# Patient Record
Sex: MALE | Race: WHITE | NOT HISPANIC OR LATINO | Employment: FULL TIME | URBAN - METROPOLITAN AREA
[De-identification: names, ages, dates, MRNs, and addresses within clinical notes are randomized per-mention and may not be internally consistent; named-entity substitution may affect disease eponyms.]

---

## 2023-04-04 ENCOUNTER — TELEPHONE (OUTPATIENT)
Dept: PRIMARY CARE | Facility: CLINIC | Age: 33
End: 2023-04-04
Payer: COMMERCIAL

## 2023-04-04 DIAGNOSIS — F41.9 ANXIETY: Primary | ICD-10-CM

## 2023-04-04 RX ORDER — ESCITALOPRAM OXALATE 20 MG/1
20 TABLET ORAL DAILY
Qty: 30 TABLET | Refills: 0 | Status: SHIPPED | OUTPATIENT
Start: 2023-04-04 | End: 2023-04-26 | Stop reason: SDUPTHER

## 2023-04-04 RX ORDER — ESCITALOPRAM OXALATE 20 MG/1
1 TABLET ORAL DAILY
COMMUNITY
Start: 2022-12-24 | End: 2023-04-04 | Stop reason: SDUPTHER

## 2023-04-05 PROBLEM — R03.0 ELEVATED BP WITHOUT DIAGNOSIS OF HYPERTENSION: Status: ACTIVE | Noted: 2023-04-05

## 2023-04-05 PROBLEM — F41.1 GAD (GENERALIZED ANXIETY DISORDER): Status: ACTIVE | Noted: 2023-04-05

## 2023-04-05 PROBLEM — K21.9 GERD (GASTROESOPHAGEAL REFLUX DISEASE): Status: ACTIVE | Noted: 2023-04-05

## 2023-04-05 PROBLEM — E78.2 MIXED HYPERLIPIDEMIA: Status: ACTIVE | Noted: 2023-04-05

## 2023-04-05 PROBLEM — K52.9 COLITIS: Status: ACTIVE | Noted: 2023-04-05

## 2023-04-05 PROBLEM — D17.39 LIPOMA OF SKIN AND SUBCUTANEOUS TISSUE OF EXTREMITY: Status: ACTIVE | Noted: 2023-04-05

## 2023-04-05 PROBLEM — E78.5 HYPERLIPIDEMIA: Status: ACTIVE | Noted: 2023-04-05

## 2023-04-05 PROBLEM — N20.0 NEPHROLITHIASIS: Status: ACTIVE | Noted: 2023-04-05

## 2023-04-05 RX ORDER — ROSUVASTATIN CALCIUM 20 MG/1
20 TABLET, COATED ORAL DAILY
COMMUNITY
End: 2023-04-26 | Stop reason: ALTCHOICE

## 2023-04-05 RX ORDER — TESTOSTERONE CYPIONATE 1000 MG/10ML
INJECTION, SOLUTION INTRAMUSCULAR
COMMUNITY
Start: 2022-04-25 | End: 2023-04-26 | Stop reason: ALTCHOICE

## 2023-04-05 RX ORDER — PANTOPRAZOLE SODIUM 40 MG/1
TABLET, DELAYED RELEASE ORAL
COMMUNITY
End: 2023-04-26 | Stop reason: ALTCHOICE

## 2023-04-26 ENCOUNTER — OFFICE VISIT (OUTPATIENT)
Dept: PRIMARY CARE | Facility: CLINIC | Age: 33
End: 2023-04-26
Payer: COMMERCIAL

## 2023-04-26 VITALS
DIASTOLIC BLOOD PRESSURE: 78 MMHG | BODY MASS INDEX: 33.07 KG/M2 | HEIGHT: 70 IN | SYSTOLIC BLOOD PRESSURE: 129 MMHG | WEIGHT: 231 LBS

## 2023-04-26 DIAGNOSIS — F41.9 ANXIETY: ICD-10-CM

## 2023-04-26 DIAGNOSIS — R22.2 ABDOMINAL WALL LUMP: Primary | ICD-10-CM

## 2023-04-26 PROBLEM — K21.9 GERD (GASTROESOPHAGEAL REFLUX DISEASE): Status: RESOLVED | Noted: 2023-04-05 | Resolved: 2023-04-26

## 2023-04-26 PROBLEM — K52.9 COLITIS: Status: RESOLVED | Noted: 2023-04-05 | Resolved: 2023-04-26

## 2023-04-26 PROBLEM — N20.0 NEPHROLITHIASIS: Status: RESOLVED | Noted: 2023-04-05 | Resolved: 2023-04-26

## 2023-04-26 PROBLEM — R03.0 ELEVATED BP WITHOUT DIAGNOSIS OF HYPERTENSION: Status: RESOLVED | Noted: 2023-04-05 | Resolved: 2023-04-26

## 2023-04-26 PROCEDURE — 99214 OFFICE O/P EST MOD 30 MIN: CPT | Performed by: FAMILY MEDICINE

## 2023-04-26 RX ORDER — ESCITALOPRAM OXALATE 20 MG/1
20 TABLET ORAL DAILY
Qty: 30 TABLET | Refills: 0 | Status: SHIPPED | OUTPATIENT
Start: 2023-04-26 | End: 2023-04-26 | Stop reason: SDUPTHER

## 2023-04-26 RX ORDER — ESCITALOPRAM OXALATE 20 MG/1
20 TABLET ORAL DAILY
Qty: 30 TABLET | Refills: 2 | Status: SHIPPED | OUTPATIENT
Start: 2023-04-26 | End: 2023-08-10 | Stop reason: SDUPTHER

## 2023-04-26 ASSESSMENT — PATIENT HEALTH QUESTIONNAIRE - PHQ9
SUM OF ALL RESPONSES TO PHQ9 QUESTIONS 1 AND 2: 0
1. LITTLE INTEREST OR PLEASURE IN DOING THINGS: NOT AT ALL
2. FEELING DOWN, DEPRESSED OR HOPELESS: NOT AT ALL

## 2023-04-26 NOTE — PROGRESS NOTES
"Subjective   Patient ID: Иван Noble is a 32 y.o. male who presents for Med Refill.    HPI   Anxiety was controlled with lexapro. No insomnia, poor appetite, depression, mood swings or hi/si. Left lower abd subcutaneous lump gets bigger and tender lately.   Review of Systems    Objective   /78   Ht 1.778 m (5' 10\")   Wt 105 kg (231 lb)   BMI 33.15 kg/m²     Physical Exam  NAD, No sclera icterus. Normal pupil size, Lungs: CTA bilaterally, heart: RRR, abdomen: soft, no tenderness, BS+. 1.5x2.5 subcutaneous mobile lump with tenderness, Good balance, no jaundice, good mood and normal affect, No HI/SI  or paranoia      Assessment/Plan   Problem List Items Addressed This Visit          Other    Abdominal wall lump - Primary     Gets bigger and tender lately. Rtc for  excision         Anxiety     Controlled. Cont lexapro. Fu in 3 mos         Relevant Medications    escitalopram (Lexapro) 20 mg tablet          "

## 2023-08-03 ENCOUNTER — TELEPHONE (OUTPATIENT)
Dept: PRIMARY CARE | Facility: CLINIC | Age: 33
End: 2023-08-03
Payer: COMMERCIAL

## 2023-08-03 NOTE — TELEPHONE ENCOUNTER
Patient is out of his escitalopram 20 mg 1d daily. Can we send a refill to [facility]? He is going ou [facility] for the week. If he needs seen he is able to come in the week after that.

## 2023-08-10 ENCOUNTER — OFFICE VISIT (OUTPATIENT)
Dept: PRIMARY CARE | Facility: CLINIC | Age: 33
End: 2023-08-10
Payer: COMMERCIAL

## 2023-08-10 VITALS — SYSTOLIC BLOOD PRESSURE: 125 MMHG | DIASTOLIC BLOOD PRESSURE: 71 MMHG

## 2023-08-10 DIAGNOSIS — F41.9 ANXIETY: ICD-10-CM

## 2023-08-10 DIAGNOSIS — F41.1 GAD (GENERALIZED ANXIETY DISORDER): Primary | ICD-10-CM

## 2023-08-10 PROCEDURE — 99213 OFFICE O/P EST LOW 20 MIN: CPT | Performed by: FAMILY MEDICINE

## 2023-08-10 RX ORDER — ESCITALOPRAM OXALATE 20 MG/1
20 TABLET ORAL DAILY
Qty: 90 TABLET | Refills: 0 | Status: SHIPPED | OUTPATIENT
Start: 2023-08-10 | End: 2023-11-08 | Stop reason: SDUPTHER

## 2023-08-10 NOTE — PROGRESS NOTES
Subjective   Patient ID: Иван Noble is a 33 y.o. male who presents for fu    HPI   Anxiety was controlled. No depression or hi/si. Good appetite and sleep  Review of Systems    Objective   /71     Physical Exam  NAD, well groomed, No sclera icterus.   lungs: CTA b/l, heart: RRR, no LE edema, abd: soft, no tenderness, BS+,  Good balance. No depressed mood, flat affect, HI/SI.    Assessment/Plan     LYNETTE controlled. Cont the same fu in 3 mos

## 2023-11-08 ENCOUNTER — TELEPHONE (OUTPATIENT)
Dept: PRIMARY CARE | Facility: CLINIC | Age: 33
End: 2023-11-08
Payer: COMMERCIAL

## 2023-11-08 DIAGNOSIS — F41.9 ANXIETY: ICD-10-CM

## 2023-11-08 RX ORDER — ESCITALOPRAM OXALATE 20 MG/1
20 TABLET ORAL DAILY
Qty: 90 TABLET | Refills: 3 | Status: SHIPPED | OUTPATIENT
Start: 2023-11-08 | End: 2024-05-10 | Stop reason: SDUPTHER

## 2023-11-08 NOTE — TELEPHONE ENCOUNTER
Needs a refill on his Lexapro 20 mg takes it 1 time a day please send to [facility] in [place]

## 2023-11-10 ENCOUNTER — APPOINTMENT (OUTPATIENT)
Dept: PRIMARY CARE | Facility: CLINIC | Age: 33
End: 2023-11-10
Payer: COMMERCIAL

## 2023-11-13 ENCOUNTER — OFFICE VISIT (OUTPATIENT)
Dept: PRIMARY CARE | Facility: CLINIC | Age: 33
End: 2023-11-13
Payer: COMMERCIAL

## 2023-11-13 VITALS — SYSTOLIC BLOOD PRESSURE: 132 MMHG | DIASTOLIC BLOOD PRESSURE: 78 MMHG

## 2023-11-13 DIAGNOSIS — F41.1 GAD (GENERALIZED ANXIETY DISORDER): Primary | ICD-10-CM

## 2023-11-13 DIAGNOSIS — Z00.00 ROUTINE MEDICAL EXAM: ICD-10-CM

## 2023-11-13 PROCEDURE — 99213 OFFICE O/P EST LOW 20 MIN: CPT | Performed by: FAMILY MEDICINE

## 2023-11-13 NOTE — PROGRESS NOTES
Subjective   Patient ID: Иван Noble is a 33 y.o. male who presents for fu    HPI   pt denies anxiety or fatigue while on lexapro. No HI/SI. Normal appetite with good sleep. No cp, heart palpitation or LE edema. No HA, dizziness    Review of Systems    Objective   /78     Physical Exam  NAD, well groomed, No sclera icterus.   lungs: CTA b/l, heart: RRR, no LE edema, abd:  soft, no tenderness, BS+,  Good balance. No depressed mood  Assessment/Plan     LYNETTE, well controlled with lexapro. No HI/SI. Cont. the same. f/u in 3 mos

## 2024-05-10 ENCOUNTER — TELEPHONE (OUTPATIENT)
Dept: PRIMARY CARE | Facility: CLINIC | Age: 34
End: 2024-05-10
Payer: COMMERCIAL

## 2024-05-10 DIAGNOSIS — F41.9 ANXIETY: ICD-10-CM

## 2024-05-10 RX ORDER — ESCITALOPRAM OXALATE 20 MG/1
20 TABLET ORAL DAILY
Qty: 10 TABLET | Refills: 0 | Status: SHIPPED | OUTPATIENT
Start: 2024-05-10 | End: 2024-05-21 | Stop reason: SDUPTHER

## 2024-05-10 NOTE — TELEPHONE ENCOUNTER
escitalopram (Lexapro) 20 mg tablet//Take 1 tablet (20 mg) by mouth once daily.    [facility]    DC instructions

## 2024-05-10 NOTE — TELEPHONE ENCOUNTER
Pt is completely out of medication and has an appt next week. Pt would like to know if you can call in enough of RX to get him to next week?

## 2024-05-16 ENCOUNTER — APPOINTMENT (OUTPATIENT)
Dept: PRIMARY CARE | Facility: CLINIC | Age: 34
End: 2024-05-16
Payer: COMMERCIAL

## 2024-05-21 ENCOUNTER — OFFICE VISIT (OUTPATIENT)
Dept: PRIMARY CARE | Facility: CLINIC | Age: 34
End: 2024-05-21
Payer: COMMERCIAL

## 2024-05-21 VITALS — DIASTOLIC BLOOD PRESSURE: 73 MMHG | SYSTOLIC BLOOD PRESSURE: 126 MMHG

## 2024-05-21 DIAGNOSIS — F41.9 ANXIETY: ICD-10-CM

## 2024-05-21 PROCEDURE — 99213 OFFICE O/P EST LOW 20 MIN: CPT | Performed by: FAMILY MEDICINE

## 2024-05-21 RX ORDER — ESCITALOPRAM OXALATE 20 MG/1
20 TABLET ORAL DAILY
Qty: 100 TABLET | Refills: 1 | Status: SHIPPED | OUTPATIENT
Start: 2024-05-21

## 2024-05-21 NOTE — PROGRESS NOTES
Subjective   Patient ID: Иван Noble is a 33 y.o. male who presents for rosita fu    HPI   pt denies anxiety, depressed mood while on lexapro. No HI/SI. Normal appetite with good sleep. No cp, heart palpitation or LE edema. No HA, dizziness, imbalance.    Review of Systems    Objective   /73     Physical Exam  NAD, well groomed, No sclera icterus. lungs: CTA b/l, heart: RRR, no LE edema, abd: soft, no tenderness, BS+,  Good balance. No depressed mood  Assessment/Plan   Problem List Items Addressed This Visit             ICD-10-CM    Anxiety F41.9      well controlled with lexapro. No HI/SI. Cont. the same. f/u in 6 mos

## 2024-12-11 ENCOUNTER — TELEMEDICINE (OUTPATIENT)
Dept: PRIMARY CARE | Facility: CLINIC | Age: 34
End: 2024-12-11
Payer: COMMERCIAL

## 2024-12-11 DIAGNOSIS — F41.9 ANXIETY: ICD-10-CM

## 2024-12-11 PROCEDURE — 99213 OFFICE O/P EST LOW 20 MIN: CPT | Performed by: FAMILY MEDICINE

## 2024-12-11 RX ORDER — ESCITALOPRAM OXALATE 20 MG/1
20 TABLET ORAL DAILY
Qty: 100 TABLET | Refills: 1 | Status: SHIPPED | OUTPATIENT
Start: 2024-12-11

## 2024-12-11 NOTE — PROGRESS NOTES
Subjective   Patient ID: Иван Noble is a 34 y.o. male who presents for No chief complaint on file..    HPI   pt denies anxiety, lack of interests while on lexapro. Normal appetite.  good sleep. No cp, heart palpitation or LE edema. No HA, dizziness, imbalance.    Review of Systems    Objective   There were no vitals taken for this visit.    Physical Exam    Assessment/Plan   Assessment & Plan  Anxiety  Controlled. No depression. Cont the same. Fu in 6 mos  Orders:    escitalopram (Lexapro) 20 mg tablet; Take 1 tablet (20 mg) by mouth once daily.

## 2024-12-11 NOTE — ASSESSMENT & PLAN NOTE
Controlled. No depression. Cont the same. Fu in 6 mos  Orders:    escitalopram (Lexapro) 20 mg tablet; Take 1 tablet (20 mg) by mouth once daily.    
Discharged

## 2025-07-07 ENCOUNTER — TELEPHONE (OUTPATIENT)
Dept: PRIMARY CARE | Facility: CLINIC | Age: 35
End: 2025-07-07
Payer: COMMERCIAL

## 2025-07-07 DIAGNOSIS — F41.9 ANXIETY: ICD-10-CM

## 2025-07-07 RX ORDER — ESCITALOPRAM OXALATE 20 MG/1
20 TABLET ORAL DAILY
Qty: 14 TABLET | Refills: 0 | Status: SHIPPED | OUTPATIENT
Start: 2025-07-07 | End: 2025-07-16 | Stop reason: SDUPTHER

## 2025-07-07 NOTE — TELEPHONE ENCOUNTER
Pt has made an appt and is requesting enough RX escitalopram (Lexapro) 20 mg tablet//Take 1 tablet (20 mg) by mouth once daily. To get him to his visit date?  [id number] - [place], [place]

## 2025-07-16 ENCOUNTER — APPOINTMENT (OUTPATIENT)
Dept: PRIMARY CARE | Facility: CLINIC | Age: 35
End: 2025-07-16
Payer: COMMERCIAL

## 2025-07-16 VITALS — SYSTOLIC BLOOD PRESSURE: 123 MMHG | DIASTOLIC BLOOD PRESSURE: 66 MMHG

## 2025-07-16 DIAGNOSIS — F41.9 ANXIETY: ICD-10-CM

## 2025-07-16 PROCEDURE — 99213 OFFICE O/P EST LOW 20 MIN: CPT | Performed by: FAMILY MEDICINE

## 2025-07-16 RX ORDER — ESCITALOPRAM OXALATE 20 MG/1
20 TABLET ORAL DAILY
Qty: 100 TABLET | Refills: 1 | Status: SHIPPED | OUTPATIENT
Start: 2025-07-16

## 2025-07-16 RX ORDER — TESTOSTERONE CYPIONATE 200 MG/ML
INJECTION, SOLUTION INTRAMUSCULAR
COMMUNITY
Start: 2024-12-17

## 2025-07-16 ASSESSMENT — PATIENT HEALTH QUESTIONNAIRE - PHQ9
2. FEELING DOWN, DEPRESSED OR HOPELESS: NOT AT ALL
SUM OF ALL RESPONSES TO PHQ9 QUESTIONS 1 AND 2: 0
1. LITTLE INTEREST OR PLEASURE IN DOING THINGS: NOT AT ALL

## 2025-07-16 NOTE — ASSESSMENT & PLAN NOTE
well controlled. No HI/SI. Cont. the same. f/u in 6  mos    Orders:    escitalopram (Lexapro) 20 mg tablet; Take 1 tablet (20 mg) by mouth once daily.

## 2025-07-16 NOTE — PROGRESS NOTES
Subjective   Patient ID: Иван Noble is a 35 y.o. male who presents for No chief complaint on file..    HPI   pt denies anxiety or depressed mood while on lexapro. No mood swings or  HI/SI. No hallucination or delusion. Normal appetite.  good sleep. No cp, heart palpitation or LE edema. No HA, dizziness, imbalance.    Review of Systems    Objective   There were no vitals taken for this visit.    Physical Exam  NAD, well groomed, No sclera icterus.  lungs: CTA b/l, heart: RRR, no LE edema, abd: soft, no tenderness, BS+,  Good balance. No depressed mood  Assessment/Plan   Assessment & Plan  Anxiety  well controlled. No HI/SI. Cont. the same. f/u in 6  mos    Orders:    escitalopram (Lexapro) 20 mg tablet; Take 1 tablet (20 mg) by mouth once daily.